# Patient Record
Sex: FEMALE | Race: BLACK OR AFRICAN AMERICAN
[De-identification: names, ages, dates, MRNs, and addresses within clinical notes are randomized per-mention and may not be internally consistent; named-entity substitution may affect disease eponyms.]

---

## 2018-11-30 ENCOUNTER — HOSPITAL ENCOUNTER (EMERGENCY)
Dept: HOSPITAL 61 - ER | Age: 23
Discharge: HOME | End: 2018-11-30
Payer: SELF-PAY

## 2018-11-30 VITALS — BODY MASS INDEX: 21.85 KG/M2 | HEIGHT: 64 IN | WEIGHT: 128 LBS

## 2018-11-30 VITALS — DIASTOLIC BLOOD PRESSURE: 75 MMHG | SYSTOLIC BLOOD PRESSURE: 124 MMHG

## 2018-11-30 DIAGNOSIS — J45.909: ICD-10-CM

## 2018-11-30 DIAGNOSIS — G89.29: ICD-10-CM

## 2018-11-30 DIAGNOSIS — G43.909: Primary | ICD-10-CM

## 2018-11-30 LAB
APTT PPP: YELLOW S
BACTERIA #/AREA URNS HPF: (no result) /HPF
BILIRUB UR QL STRIP: NEGATIVE
FIBRINOGEN PPP-MCNC: CLEAR MG/DL
NITRITE UR QL STRIP: NEGATIVE
PH UR STRIP: 7.5 [PH]
PROT UR STRIP-MCNC: NEGATIVE MG/DL
RBC #/AREA URNS HPF: (no result) /HPF (ref 0–2)
SQUAMOUS #/AREA URNS LPF: (no result) /LPF
UROBILINOGEN UR-MCNC: 0.2 MG/DL
WBC #/AREA URNS HPF: (no result) /HPF (ref 0–4)

## 2018-11-30 PROCEDURE — 99283 EMERGENCY DEPT VISIT LOW MDM: CPT

## 2018-11-30 PROCEDURE — 81025 URINE PREGNANCY TEST: CPT

## 2018-11-30 PROCEDURE — 81001 URINALYSIS AUTO W/SCOPE: CPT

## 2018-11-30 NOTE — PHYS DOC
Past Medical History


Past Medical History:  Asthma, Endometriosis, Migraines, Other


Additional Past Medical Histor:  CHRONIC ABD PAIN W/MENSTRUAL CYCLES


Past Surgical History:  Other


Additional Past Surgical Histo:  ENDOMETRIOSIS SURG


Alcohol Use:  None


Drug Use:  None





Adult General


Chief Complaint


Chief Complaint:  HEADACHE





HPI


HPI


23-year-old female with a history of endometriosis presenting to the emergency 

department today with a headache that is been gone on for one month. It is 

nonradiating. Her headache was not sudden in onset. She endorses being 

triggered by light this morning when the sun rise hit her eyes driving to work. 

She denies any fevers chills neck stiffness. Her headache is similar to 

previous migraines.








Review of systems is negative for chest pain shortness of breath neck pain neck 

stiffness abdominal pain fevers or chills. Negative for numbness weakness or 

tingling. She reports normal speech and normal ambulation. All other review of 

systems is negative unless otherwise noted in history of present illness.





ED course: 23-year-old female presenting with headache similar to previous 

headaches. On arrival she is afebrile with normal heart rate. She is satting 

well on room air. Blood pressure within normal limits. On examination she is 

well-appearing with a normal neurologic exam. Funduscopic exam shows no signs 

of papilledema. Normal range of motion of the neck. The patient was given IV 

fluids with Reglan. She specifically requested no Benadryl because she reports 

it makes her feel fidgety. After ordering this, the patient does not want any 

IV medications. I offered the patient oral ibuprofen. The patient has been 

examined and was not found to have an emergency medical condition. The patient 

was then discharged home in stable condition to follow up with their primary 

care physician over the next 2-3 days. They were to return if their symptoms 

worsened or if they were concerned for any reason.  They were also instructed 

to return to the emergency department if they were unable to get the 

recommended and appropriate follow-up.  Face-to-face discharge instructions and 

return precautions were given. Patient's questions were answered.





Review of Systems


Review of Systems


SEE ABOVE.





Allergies


Allergies





Allergies








Coded Allergies Type Severity Reaction Last Updated Verified


 


  No Known Drug Allergies    2/2/15 No











Physical Exam


Physical Exam


SEE ABOVE





Constitutional: Well developed, well nourished, no acute distress, non-toxic 

appearance. 


HENT: Normocephalic, atraumatic, bilateral external ears normal, oropharynx 

moist, no oral exudates, nose normal. []


Eyes: PERRLA, EOMI, conjunctiva normal, no discharge.  no papilledema on 

retinal exam.


Neck: Normal range of motion, no tenderness, supple, no stridor.  Negative 

Brudzinski's sign. Negative Kernig sign


Cardiovascular:Heart rate regular rhythm, no murmur []


Lungs & Thorax:  Bilateral breath sounds clear to auscultation 


Abdomen: Bowel sounds normal, soft, no tenderness, no masses, no pulsatile 

masses. [] 


Skin: Warm, dry, no erythema, no rash.  


Back: No tenderness, no CVA tenderness. [] 


Extremities: No tenderness, no cyanosis, no clubbing, ROM intact, no edema. [] 


Neurologic: 





Mental status: Awake oriented and alert x3





Cranial nerves: Extraocular movements intact, eyebrows gareth bilaterally, smile 

symmetric, uvula elevation nl, shoulder shrug intact bilaterally, tongue 

protrusion normal





DTRs: 2+





Sensation: equal and normal in all extremities





Strength: 5/5 in upper and lower extremities bilaterally








Psychologic: Affect normal, judgement normal, mood normal. []





Current Patient Data


Vital Signs





 Vital Signs








  Date Time  Temp Pulse Resp B/P (MAP) Pulse Ox O2 Delivery O2 Flow Rate FiO2


 


11/30/18 09:38 98.1 85 20 124/75 (91) 100 Room Air  





 98.1       








Lab Values





 Laboratory Tests








Test


 11/30/18


09:57


 


POC Urine HCG, Qualitative


 Hcg negative


(Negative)











EKG


EKG


[]





Radiology/Procedures


Radiology/Procedures


[]





Course & Med Decision Making


Course & Med Decision Making


Pertinent Labs and Imaging studies reviewed. (See chart for details)





[]





Dragon Disclaimer


Dragon Disclaimer


This electronic medical record was generated, in whole or in part, using a 

voice recognition dictation system.





Departure


Departure


Impression:  


 Primary Impression:  


 Headache


Disposition:  01 HOME, SELF-CARE


Condition:  STABLE


Referrals:  


NO PCP (PCP)








KEIRY LUONG MD


Patient Instructions:  General Headache Without Cause, Easy-to-Read





Additional Instructions:  


Thank you for allowing us to participate in your care today.





Return to the emergency department you have any new or worsening symptoms, or 

if you are concerned for any reason. Return to emergency department if you have 

any  new or concerning symptoms including but not limited to fever, chills, 

nausea, vomiting, intractable pain, any new rashes, chest pain, shortness of air

, uncontrolled bleeding, difficulty breathing, and/or vision loss.





Follow up with your primary care physician and a neurologist within 3 days.  

Call your Primary Doctor tomorrow and inform them of your visit today.  If you 

do not have a primary care provider we are happy to provide you with a list of 

our primary care providers contact information. 





This condition should be evaluated by your primary care physician and any 

recommended consulting services for continued management within 2-3 days after 

discharge. If at any time, you are having difficulty getting into your primary 

care doctor or a specialist, return to the emergency department.











ARA FRASER MD Nov 30, 2018 10:13